# Patient Record
Sex: FEMALE | Race: WHITE
[De-identification: names, ages, dates, MRNs, and addresses within clinical notes are randomized per-mention and may not be internally consistent; named-entity substitution may affect disease eponyms.]

---

## 2021-01-21 ENCOUNTER — HOSPITAL ENCOUNTER (INPATIENT)
Dept: HOSPITAL 54 - ER | Age: 41
LOS: 1 days | Discharge: ANOTHER HOSPITAL | DRG: 853 | End: 2021-01-22
Attending: LEGAL MEDICINE | Admitting: LEGAL MEDICINE
Payer: COMMERCIAL

## 2021-01-21 VITALS — HEIGHT: 64 IN | BODY MASS INDEX: 29.53 KG/M2 | WEIGHT: 173 LBS

## 2021-01-21 DIAGNOSIS — A41.9: Primary | ICD-10-CM

## 2021-01-21 DIAGNOSIS — G93.1: ICD-10-CM

## 2021-01-21 DIAGNOSIS — I10: ICD-10-CM

## 2021-01-21 DIAGNOSIS — R13.10: ICD-10-CM

## 2021-01-21 DIAGNOSIS — R53.2: ICD-10-CM

## 2021-01-21 DIAGNOSIS — Z86.73: ICD-10-CM

## 2021-01-21 DIAGNOSIS — Z99.11: ICD-10-CM

## 2021-01-21 DIAGNOSIS — Z20.822: ICD-10-CM

## 2021-01-21 DIAGNOSIS — Z86.74: ICD-10-CM

## 2021-01-21 DIAGNOSIS — L89.814: ICD-10-CM

## 2021-01-21 DIAGNOSIS — Z86.711: ICD-10-CM

## 2021-01-21 DIAGNOSIS — Z93.1: ICD-10-CM

## 2021-01-21 DIAGNOSIS — Z93.0: ICD-10-CM

## 2021-01-21 DIAGNOSIS — L03.811: ICD-10-CM

## 2021-01-21 DIAGNOSIS — J96.10: ICD-10-CM

## 2021-01-21 LAB
BILIRUB UR QL STRIP: NEGATIVE
COLOR UR: YELLOW
GLUCOSE UR STRIP-MCNC: NEGATIVE MG/DL
LEUKOCYTE ESTERASE UR QL STRIP: NEGATIVE
NITRITE UR QL STRIP: NEGATIVE
PH UR STRIP: 7 [PH] (ref 5–8)
PROT UR QL STRIP: NEGATIVE MG/DL
UROBILINOGEN UR STRIP-MCNC: 0.2 EU/DL

## 2021-01-21 PROCEDURE — C9113 INJ PANTOPRAZOLE SODIUM, VIA: HCPCS

## 2021-01-21 PROCEDURE — U0003 INFECTIOUS AGENT DETECTION BY NUCLEIC ACID (DNA OR RNA); SEVERE ACUTE RESPIRATORY SYNDROME CORONAVIRUS 2 (SARS-COV-2) (CORONAVIRUS DISEASE [COVID-19]), AMPLIFIED PROBE TECHNIQUE, MAKING USE OF HIGH THROUGHPUT TECHNOLOGIES AS DESCRIBED BY CMS-2020-01-R: HCPCS

## 2021-01-21 PROCEDURE — G0378 HOSPITAL OBSERVATION PER HR: HCPCS

## 2021-01-21 PROCEDURE — A6403 STERILE GAUZE>16 <= 48 SQ IN: HCPCS

## 2021-01-21 PROCEDURE — C9803 HOPD COVID-19 SPEC COLLECT: HCPCS

## 2021-01-21 PROCEDURE — A6253 ABSORPT DRG > 48 SQ IN W/O B: HCPCS

## 2021-01-21 NOTE — NUR
ms rn notes 

spoke to   shaun jacobs (mother) made aware that pts is going to be discharge to  [facility]  d/t insurance issue .and that she shes aware of it .

-------------------------------------------------------------------------------

Addendum: 01/22/21 at 2217 by AMBER GUERRA RN

-------------------------------------------------------------------------------

this note is for 1/22/21

## 2021-01-21 NOTE — NUR
BIBIANA FROM [facility] SENT BY PMD FOR POSTERIOR SCALP WORSENING WOUND. 
PT AAOX1, CHRONIC TRACH 4L/TRACHE TUBE, O2 %. SKIN WARM, NONDIAPHORETIC. 
PLACE PT ON CARDIAC MONITORING, CONTINUOUS POX. PENDING ER MD MIRANDA.

## 2021-01-22 VITALS — DIASTOLIC BLOOD PRESSURE: 62 MMHG | SYSTOLIC BLOOD PRESSURE: 107 MMHG

## 2021-01-22 VITALS — DIASTOLIC BLOOD PRESSURE: 61 MMHG | SYSTOLIC BLOOD PRESSURE: 122 MMHG

## 2021-01-22 LAB
ALBUMIN SERPL BCP-MCNC: 2.6 G/DL (ref 3.4–5)
ALP SERPL-CCNC: 98 U/L (ref 46–116)
ALT SERPL W P-5'-P-CCNC: 23 U/L (ref 12–78)
AST SERPL W P-5'-P-CCNC: 14 U/L (ref 15–37)
BASOPHILS # BLD AUTO: 0 /CMM (ref 0–0.2)
BASOPHILS # BLD AUTO: 0.1 /CMM (ref 0–0.2)
BASOPHILS NFR BLD AUTO: 0.3 % (ref 0–2)
BASOPHILS NFR BLD AUTO: 0.8 % (ref 0–2)
BILIRUB DIRECT SERPL-MCNC: 0.1 MG/DL (ref 0–0.2)
BILIRUB SERPL-MCNC: 0.2 MG/DL (ref 0.2–1)
BUN SERPL-MCNC: 6 MG/DL (ref 7–18)
BUN SERPL-MCNC: 9 MG/DL (ref 7–18)
CALCIUM SERPL-MCNC: 9.3 MG/DL (ref 8.5–10.1)
CALCIUM SERPL-MCNC: 9.6 MG/DL (ref 8.5–10.1)
CHLORIDE SERPL-SCNC: 103 MMOL/L (ref 98–107)
CHLORIDE SERPL-SCNC: 106 MMOL/L (ref 98–107)
CO2 SERPL-SCNC: 27 MMOL/L (ref 21–32)
CO2 SERPL-SCNC: 32 MMOL/L (ref 21–32)
CREAT SERPL-MCNC: 0.5 MG/DL (ref 0.6–1.3)
CREAT SERPL-MCNC: 0.5 MG/DL (ref 0.6–1.3)
EOSINOPHIL NFR BLD AUTO: 2.5 % (ref 0–6)
EOSINOPHIL NFR BLD AUTO: 3.2 % (ref 0–6)
GLUCOSE SERPL-MCNC: 111 MG/DL (ref 74–106)
GLUCOSE SERPL-MCNC: 126 MG/DL (ref 74–106)
HCT VFR BLD AUTO: 29 % (ref 33–45)
HCT VFR BLD AUTO: 29 % (ref 33–45)
HGB BLD-MCNC: 9.2 G/DL (ref 11.5–14.8)
HGB BLD-MCNC: 9.4 G/DL (ref 11.5–14.8)
LYMPHOCYTES NFR BLD AUTO: 2.5 /CMM (ref 0.8–4.8)
LYMPHOCYTES NFR BLD AUTO: 27.9 % (ref 20–44)
LYMPHOCYTES NFR BLD AUTO: 28.1 % (ref 20–44)
LYMPHOCYTES NFR BLD AUTO: 3.2 /CMM (ref 0.8–4.8)
MCHC RBC AUTO-ENTMCNC: 32 G/DL (ref 31–36)
MCHC RBC AUTO-ENTMCNC: 32 G/DL (ref 31–36)
MCV RBC AUTO: 83 FL (ref 82–100)
MCV RBC AUTO: 85 FL (ref 82–100)
MONOCYTES NFR BLD AUTO: 0.6 /CMM (ref 0.1–1.3)
MONOCYTES NFR BLD AUTO: 0.8 /CMM (ref 0.1–1.3)
MONOCYTES NFR BLD AUTO: 6.9 % (ref 2–12)
MONOCYTES NFR BLD AUTO: 7.2 % (ref 2–12)
NEUTROPHILS # BLD AUTO: 5.4 /CMM (ref 1.8–8.9)
NEUTROPHILS # BLD AUTO: 6.9 /CMM (ref 1.8–8.9)
NEUTROPHILS NFR BLD AUTO: 61.2 % (ref 43–81)
NEUTROPHILS NFR BLD AUTO: 61.9 % (ref 43–81)
PLATELET # BLD AUTO: 379 /CMM (ref 150–450)
PLATELET # BLD AUTO: 498 /CMM (ref 150–450)
POTASSIUM SERPL-SCNC: 3.9 MMOL/L (ref 3.5–5.1)
POTASSIUM SERPL-SCNC: 3.9 MMOL/L (ref 3.5–5.1)
PROT SERPL-MCNC: 7.7 G/DL (ref 6.4–8.2)
RBC # BLD AUTO: 3.44 MIL/UL (ref 4–5.2)
RBC # BLD AUTO: 3.52 MIL/UL (ref 4–5.2)
RBC #/AREA URNS HPF: (no result) /HPF (ref 0–2)
SODIUM SERPL-SCNC: 142 MMOL/L (ref 136–145)
SODIUM SERPL-SCNC: 143 MMOL/L (ref 136–145)
WBC #/AREA URNS HPF: (no result) /HPF (ref 0–3)
WBC NRBC COR # BLD AUTO: 11.3 K/UL (ref 4.3–11)
WBC NRBC COR # BLD AUTO: 8.8 K/UL (ref 4.3–11)

## 2021-01-22 PROCEDURE — 0NB00ZZ EXCISION OF SKULL, OPEN APPROACH: ICD-10-PCS

## 2021-01-22 RX ADMIN — METOPROLOL TARTRATE SCH MG: 25 TABLET, FILM COATED ORAL at 11:27

## 2021-01-22 RX ADMIN — METOPROLOL TARTRATE SCH MG: 25 TABLET, FILM COATED ORAL at 21:40

## 2021-01-22 RX ADMIN — PIPERACILLIN SODIUM AND TAZOBACTAM SODIUM SCH MLS/HR: .375; 3 INJECTION, POWDER, LYOPHILIZED, FOR SOLUTION INTRAVENOUS at 18:01

## 2021-01-22 RX ADMIN — DEXTROSE AND SODIUM CHLORIDE PRN MLS/HR: 5; 900 INJECTION, SOLUTION INTRAVENOUS at 05:30

## 2021-01-22 RX ADMIN — DEXTROSE AND SODIUM CHLORIDE PRN MLS/HR: 5; 900 INJECTION, SOLUTION INTRAVENOUS at 18:01

## 2021-01-22 RX ADMIN — PIPERACILLIN SODIUM AND TAZOBACTAM SODIUM SCH MLS/HR: .375; 3 INJECTION, POWDER, LYOPHILIZED, FOR SOLUTION INTRAVENOUS at 11:28

## 2021-01-22 NOTE — NUR
RN NOTE



CALLED DR. LONGORIA REGARDING DISCHARGE. MD OK TO DISCHARGE PT TO [facility]. AND 
PER DR LONGORIA, DR TOMAS IS AWARE.

## 2021-01-22 NOTE — NUR
INFORMED DR. TOMAS THAT THE PATIENT'S HEART IS CONTINUOUSLY INCREASING UPTO 
130-140. GAVE ORDER TO START METOPROLOL 25MG Q12HR FROM HOME MEDS.

## 2021-01-22 NOTE — NUR
RN NOTE

RECEIVED PATIENT FROM ER JONATHAN DOSHI. PATIENT IS IN BED WITH HOB AT SEMI FOWLERS POSITION. 
PATIENT IS ON 4L TPIECE TRACH. PATIENT IS AOXO WITH EYE MOVEMENT. JULIAN CATHETER IN PLACE. 
SACRAL AND BILATERAL HEEL REDNESS NOTED. BACK OF HEAD WOUND NOTED. GT TUBE IS IN PLACE. 
LWRIST #18 IS IN PLACE PATENT AND INTACT. BED IS LOCKED IN THE LOWEST POSITION, CALL BELL 
WITHIN REACH, 3 GUARD RAILS RAISED, AND ALL HOSPITAL SAFETY PRECAUTIONS ARE BEING FOLLOWED. 
WILL CONTINUE TO MONITOR.

## 2021-01-22 NOTE — NUR
[facility] PATIENT IS SUPPOSED TO BE 
DR. LONGORIA'S PATIENT. INFORMED  THAT PATIENT SEEN BY DR. TOMAS AND 
HAS BEEN ADMITTED SINCE LAST NIGHT AROUND 1AM.

## 2021-01-22 NOTE — NUR
PATIENT TRANSFERRED TO ROOM 106 VIA ACLS PROTOCOL. NO DISTRESS NOTED. NEEDS 
ATTENDED. PATIENT CURRENTLY ON 3LPM VIA T-PIECE IN STABLE CONDITION. ENDORSED 
TO REX.

## 2021-01-22 NOTE — NUR
RN NOTE



PT PICKED UP 2 EMTS OF [facility], DISCHARGE TO [facility] VIA [facility]. 
PT LEFT IN STABLE CONDITION. NO RESP. DISTRESS NOTED

## 2021-01-22 NOTE — NUR
RN CLOSING NOTE

PATIENT IS IN BED WITH HOB AT SEMI FOWLERS POSITION. PATIENT IS ON 4L TPIECE TRACH. PATIENT 
IS AOXO WITH EYE MOVEMENT. JULIAN CATHETER IN PLACE. SACRAL AND BILATERAL HEEL REDNESS NOTED. 
BACK OF HEAD WOUND NOTED. GT TUBE IS IN PLACE. LWRIST #18 IS IN PLACE PATENT AND INTACT. BED 
IS LOCKED IN THE LOWEST POSITION, CALL BELL WITHIN REACH, 3 GUARD RAILS RAISED, AND ALL 
HOSPITAL SAFETY PRECAUTIONS ARE BEING FOLLOWED. WILL ENDORSE TO NIGHT SHIFT RN.

## 2021-01-22 NOTE — NUR
ms rn notes 

spoke to   shaun jacobs (mother) made aware that pts is going to be discharge to  [facility]  d/t insurance issue .and that she shes aware of it .

## 2021-01-22 NOTE — NUR
RN NOTE



RECEIVED CALL FROM  KIRBY FROM [facility].  TO BE TRANSFERRED TO [facility]. CM ARRANGED TRANSPORTATION.

## 2021-01-22 NOTE — NUR
PATIENT SEEN AND EVALUATED BY WOUND NURSE AGA. APPLIED ABD PAD ON THE BACK OF 
HEAD AND COVERED WITH ISAI. GEL FOAM ALSO APPLIED ON SACRAL WOUND AND AMIRAH. 
HEELS OFFLOADED. PATIENT TURNED AND REPOSITIONED AS WELL.

## 2021-01-22 NOTE — NUR
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 
AS WELL AS SACRAL SCAR, PRESENT ON ADMISSION. RECOMMEND SURGICAL CONSULT. DR WARNER 
NOTIFIED OF CONSULT REQUEST. DISCUSSED WOUND CARE AND SKIN PROTECTION WITH NURSING STAFF AND 
CARRILLO SHERMAN, SURGICAL CRISTA ANDRADE IN AGREEMENT WITH PLAN OF CARE. 

-------------------------------------------------------------------------------

Addendum: 01/22/21 at 0923 by AGA CHOWDHURY WNDNU

-------------------------------------------------------------------------------

Amended: Links added.

## 2021-01-22 NOTE — NUR
PT ASLEEP, NO ACUTE DISTRESS NOTED, RESP EVEN AND UNLABORED. NO PAIN OR 
DISCOMFORT NOTED. CALL LIGHT WITHIN [facility]. WILL CONTINUE TO MONITOR.

## 2021-01-22 NOTE — NUR
total pt care done, bed linen and gown changed. suctioned pt via trache and 
orally. repositioned pt for comfort.

## 2021-01-22 NOTE — NUR
RN NOTES



RECEIVED PT IN BED WITH TPIECE TRACH ON 4L SATING  %. NO DISTRESS NOTED. PT NONVERBAL, 
OPEN EYES. WOUND DRESSING ON HEAD DRY AND INTACT. GT IN PLACE. JULIAN CATH INDWELLING WITH 
CLEAR YELLOW URINE OUTPUT. PT WITH LEFT WRIST G18 AND TEDDY G20 IV. D5NS RUNNING AT 75CC/HR NO 
SIGNS OF INFILTRATION NOTED.  BILATERAL HEEL OFFLOADED WITH PILLOW. ALL SAFETY MEASURES 
IMPLEMENTED PER PROTOCOL. BED LOCKED IN LOWEST POSITION. SIDE RAILS UP X2.